# Patient Record
Sex: MALE | Race: WHITE | NOT HISPANIC OR LATINO
[De-identification: names, ages, dates, MRNs, and addresses within clinical notes are randomized per-mention and may not be internally consistent; named-entity substitution may affect disease eponyms.]

---

## 2023-04-14 PROBLEM — Z00.00 ENCOUNTER FOR PREVENTIVE HEALTH EXAMINATION: Status: ACTIVE | Noted: 2023-04-14

## 2023-04-18 ENCOUNTER — APPOINTMENT (OUTPATIENT)
Dept: UROLOGY | Facility: CLINIC | Age: 28
End: 2023-04-18
Payer: MEDICAID

## 2023-04-18 VITALS
TEMPERATURE: 97.3 F | SYSTOLIC BLOOD PRESSURE: 151 MMHG | HEIGHT: 72 IN | HEART RATE: 85 BPM | WEIGHT: 280 LBS | BODY MASS INDEX: 37.93 KG/M2 | OXYGEN SATURATION: 98 % | DIASTOLIC BLOOD PRESSURE: 91 MMHG

## 2023-04-18 DIAGNOSIS — Z84.1 FAMILY HISTORY OF DISORDERS OF KIDNEY AND URETER: ICD-10-CM

## 2023-04-18 DIAGNOSIS — Z80.9 FAMILY HISTORY OF MALIGNANT NEOPLASM, UNSPECIFIED: ICD-10-CM

## 2023-04-18 PROCEDURE — 99203 OFFICE O/P NEW LOW 30 MIN: CPT

## 2023-04-18 NOTE — ASSESSMENT
[FreeTextEntry1] : Assessment:  \par \par LOLLY COLIN is a 27 year old male with recent episode of self-limited left flank/lower back pain. Currently asymptomatic. \par \par \par Plan:  \par -Non-contrast CT - further evaluation/management pending results (to be performed near home in [place], [place])\par

## 2023-04-18 NOTE — PHYSICAL EXAM
[General Appearance - Well Developed] : well developed [General Appearance - Well Nourished] : well nourished [Normal Appearance] : normal appearance [Well Groomed] : well groomed [General Appearance - In No Acute Distress] : no acute distress [] : no respiratory distress [Costovertebral Angle Tenderness] : no ~M costovertebral angle tenderness [Oriented To Time, Place, And Person] : oriented to person, place, and time [Affect] : the affect was normal [Mood] : the mood was normal [Not Anxious] : not anxious

## 2023-04-18 NOTE — HISTORY OF PRESENT ILLNESS
[FreeTextEntry1] : Name LOLLY COLIN \par MRN [id number] \par  Oct 15 1995 \par ------------------------------------------------------------------------------------------------------------------------------------------- \par Date of First Visit: 23\par Referring Provider/PCP: ******** / ******** \par ------------------------------------------------------------------------------------------------------------------------------------------- \par CC: left flank pain, possible kidney stone\par \par History of Present Illness: LOLLY COLIN is a 27 year M who presents for evaluation of left flank pain. First episode occurred in 2022. Had similar pain in the left lower back a/w gross hematuria, which spontaneously resolved. States he saw a stone in his urine shortly thereafter but didn't collect it. CT performed after the event was unremarkable (report available in [employer]). More recently, he was in [place] 3/11/23 and had a similar attack of left lower back pain without hematuria or other associated symptoms. He was told by a doctor there that he likely had a kidney stone based on presentation but did not have any imaging to confirm/rule out. Did not see any stone and pain resolved after pain meds and some other non-analgesic "injection" given to him. \par \par Imaging: CT scan from 22 (report only): no stones or hydro b/l\par \par Previous urine cultures:  n/a\par \par Kidney Stone History:  \par First-time stone former - no\par Concurrent asymptomatic stone(s) - no\par Previous stone surgeries - no\par Previous passed stones - yes\par Comorbidities – non-contributory \par Family history of kidney stones - grandfather\par Previous metabolic evaluation - no

## 2023-04-19 ENCOUNTER — TRANSCRIPTION ENCOUNTER (OUTPATIENT)
Age: 28
End: 2023-04-19

## 2023-05-05 ENCOUNTER — NON-APPOINTMENT (OUTPATIENT)
Age: 28
End: 2023-05-05

## 2024-01-08 ENCOUNTER — APPOINTMENT (OUTPATIENT)
Dept: UROLOGY | Facility: CLINIC | Age: 29
End: 2024-01-08
Payer: MEDICAID

## 2024-01-08 DIAGNOSIS — N20.0 CALCULUS OF KIDNEY: ICD-10-CM

## 2024-01-08 PROCEDURE — ZZZZZ: CPT

## 2024-01-08 NOTE — HISTORY OF PRESENT ILLNESS
[FreeTextEntry1] : Name LOLLY COLIN MRN [id number]  Oct 15 1995 ------------------------------------------------------------------------------------------------------------------------------------------- Date of First Visit: 23 Referring Provider/PCP: ******** / ******** ------------------------------------------------------------------------------------------------------------------------------------------- CC: left flank pain, possible kidney stone  History of Present Illness: LOLLY COLIN is a 27 year M who presents for evaluation of left flank pain. First episode occurred in 2022. Had similar pain in the left lower back a/w gross hematuria, which spontaneously resolved. States he saw a stone in his urine shortly thereafter but didn't collect it. CT performed after the event was unremarkable (report available in [employer]). More recently, he was in [place] 3/11/23 and had a similar attack of left lower back pain without hematuria or other associated symptoms. He was told by a doctor there that he likely had a kidney stone based on presentation but did not have any imaging to confirm/rule out. Did not see any stone and pain resolved after pain meds and some other non-analgesic "injection" given to him.  Imaging: CT scan from 22 (report only): no stones or hydro b/l  Previous urine cultures: n/a  Kidney Stone History: First-time stone former - no Concurrent asymptomatic stone(s) - no Previous stone surgeries - no Previous passed stones - yes Comorbidities - non-contributory Family history of kidney stones - grandfather Previous metabolic evaluation - no -------------------------------------------------------------------------------------------------------------------------------------------  Interval History (2024): Patient presents with his wife via TTM visit to discuss stone prevention strategies.  Following last visit, his pain completely subsided, and he opted to forgo the CT scan for further evaluation of kidney stones. He has been feeling well. No recent stone events. No recent imaging. Never completed a metabolic evaluation. Has had 2 stone events in his lifetime.

## 2024-01-08 NOTE — ASSESSMENT
[FreeTextEntry1] : Assessment:   LOLLY COLIN is a 28-year-old male who presents via TTM to discuss stone prevention strategies. He is a recurrent stone former and has had 2 total stone episodes. No recent imaging for review.  We discussed obtaining updated imaging to evaluate for new stone growth, as last imaging was almost 18 months ago.   Reviewed generalized stone prevention strategies including: increase fluid intake to keep urine clear or very faint yellow, limit dietary salt intake, increase fruits and vegetables, limit high oxalate foods, maintain normal dietary calcium, and moderation of non-dairy animal protein.  We discussed completing a 24 hour urinalysis to further evaluate stone risk factors. If evidence of new stone growth on renal US, he will consider completing test.   Plan: -Renal US for kidney stone surveillance -Continue with general stone prevention recommendations as discussed above -Consider [facility] if new stone growth

## 2024-01-08 NOTE — REASON FOR VISIT
[Follow-up Visit ___] : a follow-up visit  for [unfilled] [Home] : at home, [unfilled] , at the time of the visit. [Medical Office: ([facility])___] : at the medical office located in  [Spouse] : spouse [Verbal consent obtained from patient] : the patient, [unfilled]